# Patient Record
Sex: FEMALE | Race: BLACK OR AFRICAN AMERICAN | NOT HISPANIC OR LATINO
[De-identification: names, ages, dates, MRNs, and addresses within clinical notes are randomized per-mention and may not be internally consistent; named-entity substitution may affect disease eponyms.]

---

## 2017-06-15 PROBLEM — Z00.00 ENCOUNTER FOR PREVENTIVE HEALTH EXAMINATION: Status: ACTIVE | Noted: 2017-06-15

## 2017-06-23 ENCOUNTER — APPOINTMENT (OUTPATIENT)
Dept: OBGYN | Facility: CLINIC | Age: 32
End: 2017-06-23

## 2017-06-23 VITALS
WEIGHT: 181 LBS | HEART RATE: 79 BPM | SYSTOLIC BLOOD PRESSURE: 132 MMHG | DIASTOLIC BLOOD PRESSURE: 74 MMHG | BODY MASS INDEX: 28.41 KG/M2 | HEIGHT: 67 IN

## 2017-07-05 LAB
C TRACH RRNA SPEC QL NAA+PROBE: NORMAL
CYTOLOGY CVX/VAG DOC THIN PREP: NORMAL
N GONORRHOEA RRNA SPEC QL NAA+PROBE: NORMAL
SOURCE AMPLIFICATION: NORMAL

## 2017-08-07 LAB
CORE LAB BIOPSY: NORMAL
HPV HIGH+LOW RISK DNA PNL CVX: NEGATIVE

## 2018-07-06 ENCOUNTER — APPOINTMENT (OUTPATIENT)
Dept: OBGYN | Facility: CLINIC | Age: 33
End: 2018-07-06

## 2018-07-25 ENCOUNTER — APPOINTMENT (OUTPATIENT)
Dept: OBGYN | Facility: CLINIC | Age: 33
End: 2018-07-25
Payer: COMMERCIAL

## 2018-07-25 VITALS
SYSTOLIC BLOOD PRESSURE: 120 MMHG | DIASTOLIC BLOOD PRESSURE: 81 MMHG | HEIGHT: 67 IN | HEART RATE: 76 BPM | WEIGHT: 178 LBS | BODY MASS INDEX: 27.94 KG/M2

## 2018-07-25 PROCEDURE — 99213 OFFICE O/P EST LOW 20 MIN: CPT

## 2018-07-25 RX ORDER — LANCETS 33 GAUGE
EACH MISCELLANEOUS
Qty: 900 | Refills: 0 | Status: ACTIVE | COMMUNITY
Start: 2018-05-14

## 2018-07-26 ENCOUNTER — APPOINTMENT (OUTPATIENT)
Dept: OBGYN | Facility: CLINIC | Age: 33
End: 2018-07-26
Payer: COMMERCIAL

## 2018-07-26 ENCOUNTER — ASOB RESULT (OUTPATIENT)
Age: 33
End: 2018-07-26

## 2018-07-26 PROCEDURE — 76830 TRANSVAGINAL US NON-OB: CPT

## 2018-07-28 LAB — BACTERIA UR CULT: NORMAL

## 2018-07-30 ENCOUNTER — OTHER (OUTPATIENT)
Age: 33
End: 2018-07-30

## 2018-08-24 ENCOUNTER — APPOINTMENT (OUTPATIENT)
Dept: OBGYN | Facility: CLINIC | Age: 33
End: 2018-08-24

## 2018-09-26 ENCOUNTER — APPOINTMENT (OUTPATIENT)
Dept: OBGYN | Facility: CLINIC | Age: 33
End: 2018-09-26
Payer: COMMERCIAL

## 2018-09-26 VITALS
HEIGHT: 67 IN | DIASTOLIC BLOOD PRESSURE: 81 MMHG | WEIGHT: 180 LBS | BODY MASS INDEX: 28.25 KG/M2 | HEART RATE: 74 BPM | SYSTOLIC BLOOD PRESSURE: 124 MMHG

## 2018-09-26 DIAGNOSIS — R10.2 PELVIC AND PERINEAL PAIN: ICD-10-CM

## 2018-09-26 DIAGNOSIS — N92.6 IRREGULAR MENSTRUATION, UNSPECIFIED: ICD-10-CM

## 2018-09-26 PROCEDURE — 99213 OFFICE O/P EST LOW 20 MIN: CPT | Mod: 25

## 2018-09-26 PROCEDURE — 99395 PREV VISIT EST AGE 18-39: CPT

## 2018-10-07 LAB — BACTERIA UR CULT: NORMAL

## 2019-08-06 ENCOUNTER — APPOINTMENT (OUTPATIENT)
Dept: OBGYN | Facility: CLINIC | Age: 34
End: 2019-08-06
Payer: COMMERCIAL

## 2019-08-06 VITALS
DIASTOLIC BLOOD PRESSURE: 83 MMHG | BODY MASS INDEX: 29.35 KG/M2 | HEART RATE: 89 BPM | WEIGHT: 187 LBS | HEIGHT: 67 IN | SYSTOLIC BLOOD PRESSURE: 127 MMHG

## 2019-08-06 DIAGNOSIS — N76.0 ACUTE VAGINITIS: ICD-10-CM

## 2019-08-06 PROCEDURE — 99214 OFFICE O/P EST MOD 30 MIN: CPT

## 2019-08-06 RX ORDER — UREA 20 %
20 CREAM (GRAM) TOPICAL
Qty: 85 | Refills: 0 | Status: DISCONTINUED | COMMUNITY
Start: 2019-03-05

## 2019-08-06 RX ORDER — METFORMIN HYDROCHLORIDE 1000 MG/1
1000 TABLET, COATED ORAL
Qty: 90 | Refills: 0 | Status: DISCONTINUED | COMMUNITY
Start: 2018-02-06 | End: 2019-08-06

## 2019-08-06 NOTE — CHIEF COMPLAINT
[Urgent Visit] : Urgent Visit [FreeTextEntry1] : This 33 yo P2  presents c/o vaginal irritation without odor, some itching, first noted around the 23rd of July after intercourse; reports menses LMP 7/28 which finished yesterday, and now pt reports vague discomfort persists, no treatments tried;  reports past hx of infidelity and requests vaginal STD screen; no issues with voiding.  Also asking questions about starting contraception, presently not using contraception; has used injections in past;  Pt has been a non-insulin dependent diabetic over the last 5 years.

## 2019-08-06 NOTE — PHYSICAL EXAM
[Normal] : vagina [Labia Majora] : labia major [Labia Minora] : labia minora [Discharge] : a  ~M vaginal discharge was present [Scant] : scant [White] : white [No Bleeding] : there was no active vaginal bleeding [Foul Smelling] : not foul smelling

## 2019-08-06 NOTE — COUNSELING
[STD (testing, results, tx)] : STD (testing, results, tx) [Contraception] : contraception [Vulvar Hygiene] : vulvar hygiene

## 2019-08-08 ENCOUNTER — OTHER (OUTPATIENT)
Age: 34
End: 2019-08-08

## 2019-08-08 DIAGNOSIS — Z30.9 ENCOUNTER FOR CONTRACEPTIVE MANAGEMENT, UNSPECIFIED: ICD-10-CM

## 2019-08-08 LAB
C TRACH RRNA SPEC QL NAA+PROBE: NOT DETECTED
CANDIDA VAG CYTO: NOT DETECTED
G VAGINALIS+PREV SP MTYP VAG QL MICRO: NOT DETECTED
N GONORRHOEA RRNA SPEC QL NAA+PROBE: NOT DETECTED
SOURCE AMPLIFICATION: NORMAL
T VAGINALIS VAG QL WET PREP: NOT DETECTED

## 2019-08-08 RX ORDER — NORETHINDRONE 0.35 MG/1
0.35 TABLET ORAL DAILY
Qty: 28 | Refills: 3 | Status: ACTIVE | COMMUNITY
Start: 2019-08-08 | End: 1900-01-01

## 2019-12-27 ENCOUNTER — APPOINTMENT (OUTPATIENT)
Dept: OBGYN | Facility: CLINIC | Age: 34
End: 2019-12-27
Payer: COMMERCIAL

## 2019-12-27 VITALS — DIASTOLIC BLOOD PRESSURE: 81 MMHG | SYSTOLIC BLOOD PRESSURE: 127 MMHG | HEART RATE: 109 BPM | HEIGHT: 67 IN

## 2019-12-27 PROCEDURE — 99213 OFFICE O/P EST LOW 20 MIN: CPT

## 2019-12-28 LAB
C TRACH RRNA SPEC QL NAA+PROBE: NOT DETECTED
CANDIDA VAG CYTO: NOT DETECTED
G VAGINALIS+PREV SP MTYP VAG QL MICRO: DETECTED
N GONORRHOEA RRNA SPEC QL NAA+PROBE: NOT DETECTED
SOURCE AMPLIFICATION: NORMAL
T VAGINALIS VAG QL WET PREP: NOT DETECTED

## 2019-12-30 NOTE — REVIEW OF SYSTEMS
[Vaginal Discharge] : vaginal discharge [Vaginal Odor] : vaginal odor [Nl] : Integumentary [Vaginal Itching] : no vaginal itching [Vaginal Bleeding] : no vaginal bleeding [Vulvar Pain] : no vulvar pain [Vulvar Itching] : no vulvar itching [Vulvar Lump] : no vulvar lump or mass [Labial Swelling] : no labial swelling

## 2019-12-30 NOTE — CHIEF COMPLAINT
[Urgent Visit] : Urgent Visit [FreeTextEntry1] : 35 y/o with c/o of vaginal discharge x 1 week.  +odor; no tich; discharge is white.  No aggravating or alleviating factors.  Pt has not taken any medicine for it.

## 2019-12-30 NOTE — PHYSICAL EXAM
[Normal] : uterus [Moderate] : moderate [Foul Smelling] : foul smelling [White] : white [Thin] : thin [No Bleeding] : there was no active vaginal bleeding [Normal Position] : in a normal position [Mass ___ cm] : a [unfilled] ~Ucm uterine mass was palpated [Uterine Adnexae] : were not tender and not enlarged [Atrophy] : no atrophy [Erythema] : no erythema [Cystocele] : no cystocele [Rectocele] : no rectocele [Discharge] : had no discharge [Pap Obtained] : a Pap smear was not performed [IUD String] : did not have an IUD string protruding out [Motion Tenderness] : there was no cervical motion tenderness [Tenderness] : nontender [Enlarged ___ wks] : not enlarged [Adnexa Tenderness] : were not tender [Ovarian Mass (___ Cm)] : there were no adnexal masses [FreeTextEntry4] : wet mount - +clue cells +whiff

## 2020-12-21 PROBLEM — N76.0 ACUTE VAGINITIS: Status: RESOLVED | Noted: 2019-08-06 | Resolved: 2020-12-21

## 2021-03-29 ENCOUNTER — APPOINTMENT (OUTPATIENT)
Dept: OBGYN | Facility: CLINIC | Age: 36
End: 2021-03-29

## 2021-04-15 ENCOUNTER — APPOINTMENT (OUTPATIENT)
Dept: OBGYN | Facility: CLINIC | Age: 36
End: 2021-04-15
Payer: COMMERCIAL

## 2021-04-15 VITALS
BODY MASS INDEX: 29.19 KG/M2 | HEART RATE: 81 BPM | TEMPERATURE: 97 F | HEIGHT: 67 IN | DIASTOLIC BLOOD PRESSURE: 82 MMHG | SYSTOLIC BLOOD PRESSURE: 121 MMHG | WEIGHT: 186 LBS

## 2021-04-15 PROCEDURE — 99395 PREV VISIT EST AGE 18-39: CPT

## 2021-04-15 PROCEDURE — 99072 ADDL SUPL MATRL&STAF TM PHE: CPT

## 2021-04-15 PROCEDURE — 99212 OFFICE O/P EST SF 10 MIN: CPT | Mod: 25

## 2021-04-16 LAB
C TRACH RRNA SPEC QL NAA+PROBE: NOT DETECTED
HPV HIGH+LOW RISK DNA PNL CVX: NOT DETECTED
N GONORRHOEA RRNA SPEC QL NAA+PROBE: NOT DETECTED
SOURCE TP AMPLIFICATION: NORMAL

## 2021-04-19 LAB — CYTOLOGY CVX/VAG DOC THIN PREP: NORMAL

## 2021-05-07 ENCOUNTER — APPOINTMENT (OUTPATIENT)
Dept: OBGYN | Facility: CLINIC | Age: 36
End: 2021-05-07
Payer: COMMERCIAL

## 2021-05-07 ENCOUNTER — ASOB RESULT (OUTPATIENT)
Age: 36
End: 2021-05-07

## 2021-05-07 PROCEDURE — 76830 TRANSVAGINAL US NON-OB: CPT

## 2021-05-07 PROCEDURE — 99072 ADDL SUPL MATRL&STAF TM PHE: CPT

## 2021-05-10 NOTE — HISTORY OF PRESENT ILLNESS
[FreeTextEntry1] : 35 y/o LMP 3/29/21 here for annual exam.  Pt with c/o of heavy periods.  Periods are regular but heavy.  Pt has also had recent surgery on her finger to remove a tumor but it was benign.  \par \par Last PAP 2019 - WNL\par \par Chart reviewed\par No other changes to PMH as per pt

## 2021-05-10 NOTE — REVIEW OF SYSTEMS
[Abn Vaginal bleeding] : abnormal vaginal bleeding [Negative] : Heme/Lymph [FreeTextEntry8] : menorrhagia

## 2021-05-10 NOTE — DISCUSSION/SUMMARY
[FreeTextEntry1] : A/P Pt with nl Gyn exam but pt with hx of menorrhagia.  Pt in stable condition.\par \par Will\par 1) PAP, gc, chl, HPV done\par 2) Pelvic sono to check for l uteri/adenomyosis\par 3) RTC one year or prn\par \par MARIANNA Lewis

## 2021-07-02 ENCOUNTER — APPOINTMENT (OUTPATIENT)
Dept: OBGYN | Facility: CLINIC | Age: 36
End: 2021-07-02

## 2021-09-14 ENCOUNTER — APPOINTMENT (OUTPATIENT)
Dept: OBGYN | Facility: CLINIC | Age: 36
End: 2021-09-14
Payer: COMMERCIAL

## 2021-09-14 VITALS
TEMPERATURE: 97.4 F | SYSTOLIC BLOOD PRESSURE: 123 MMHG | WEIGHT: 185 LBS | HEIGHT: 67 IN | BODY MASS INDEX: 29.03 KG/M2 | HEART RATE: 79 BPM | DIASTOLIC BLOOD PRESSURE: 80 MMHG

## 2021-09-14 DIAGNOSIS — N90.89 OTHER SPECIFIED NONINFLAMMATORY DISORDERS OF VULVA AND PERINEUM: ICD-10-CM

## 2021-09-14 DIAGNOSIS — N92.0 EXCESSIVE AND FREQUENT MENSTRUATION WITH REGULAR CYCLE: ICD-10-CM

## 2021-09-14 PROCEDURE — 99213 OFFICE O/P EST LOW 20 MIN: CPT

## 2021-09-14 NOTE — PHYSICAL EXAM
[Labia Majora] : normal [Labia Minora] : normal [Normal] :  normal [FreeTextEntry1] : two nevi , one in right inguinal area, and one on right thigh.

## 2021-09-14 NOTE — HISTORY OF PRESENT ILLNESS
[FreeTextEntry1] : pt presents for follow up, LNMP, 9/2/21, pt reports having menorrhagia, and had sono  5/7/21 to evaluate menorrhagia \par pt also c/o 'warty' lesion on right thigh and groin area.

## 2021-09-14 NOTE — DISCUSSION/SUMMARY
[FreeTextEntry1] : A - benign nevi of vulva\par      menorrhagia secondary to adenomyosis\par \par P- f/u 1 week for removal  of nevi. \par     pt counselled on findings of May 7th, sono. revealing adenomyosis, .pt elects expectant management.

## 2021-09-22 ENCOUNTER — APPOINTMENT (OUTPATIENT)
Dept: OBGYN | Facility: CLINIC | Age: 36
End: 2021-09-22
Payer: COMMERCIAL

## 2021-09-22 VITALS
WEIGHT: 183 LBS | DIASTOLIC BLOOD PRESSURE: 86 MMHG | BODY MASS INDEX: 28.72 KG/M2 | HEIGHT: 67 IN | SYSTOLIC BLOOD PRESSURE: 132 MMHG

## 2021-09-22 PROCEDURE — 56605 BIOPSY OF VULVA/PERINEUM: CPT

## 2021-09-22 NOTE — PROCEDURE
[Vulvar Biopsy] : Vulvar Biopsy [Time out performed] : Pre-procedure time out performed.  Patient's name, date of birth and procedure confirmed. [Consent Obtained] : Consent obtained [] : on the right thigh [Size of Biopsy Taken: ___ (mm)] : [unfilled]Umm [Local Anesthesia] : local anesthesia [Betadine] : Betadine [Sent to Pathology] : placed in buffered formalin and sent for pathology [Punch] : punch biopsy [Silver Nitrate] : silver nitrate [Tolerated Well] : the patient tolerated the procedure well [No Complications] : there were no complications

## 2021-09-28 ENCOUNTER — APPOINTMENT (OUTPATIENT)
Dept: OBGYN | Facility: CLINIC | Age: 36
End: 2021-09-28
Payer: COMMERCIAL

## 2021-09-28 VITALS
SYSTOLIC BLOOD PRESSURE: 130 MMHG | BODY MASS INDEX: 29.55 KG/M2 | DIASTOLIC BLOOD PRESSURE: 91 MMHG | TEMPERATURE: 96.4 F | HEART RATE: 77 BPM | WEIGHT: 188.25 LBS | HEIGHT: 67 IN

## 2021-09-28 DIAGNOSIS — D28.0 BENIGN NEOPLASM OF VULVA: ICD-10-CM

## 2021-09-28 PROCEDURE — 99212 OFFICE O/P EST SF 10 MIN: CPT

## 2021-09-28 NOTE — HISTORY OF PRESENT ILLNESS
[FreeTextEntry1] : pt presents for follow up, s/p excision  via punch biopsy of benign vulvar lesions with AgNO3 cauterization 6 days ago. pt is concerned that they may be infected.

## 2021-09-28 NOTE — DISCUSSION/SUMMARY
[FreeTextEntry1] : A - Benign Neoplasm of Vulva, s/p excision ( 2 areas)\par \par P- areas with appropriate induration and healing \par     pt reassured, and instructed to wash areas directly with soap and water. \par     anticipate full healing in 2-3 weeks.

## 2022-10-20 ENCOUNTER — APPOINTMENT (OUTPATIENT)
Dept: OBGYN | Facility: CLINIC | Age: 37
End: 2022-10-20

## 2022-10-20 VITALS
HEIGHT: 67 IN | DIASTOLIC BLOOD PRESSURE: 85 MMHG | HEART RATE: 89 BPM | SYSTOLIC BLOOD PRESSURE: 121 MMHG | WEIGHT: 182 LBS | BODY MASS INDEX: 28.56 KG/M2

## 2022-10-20 DIAGNOSIS — B37.31 ACUTE CANDIDIASIS OF VULVA AND VAGINA: ICD-10-CM

## 2022-10-20 DIAGNOSIS — N90.89 OTHER SPECIFIED NONINFLAMMATORY DISORDERS OF VULVA AND PERINEUM: ICD-10-CM

## 2022-10-20 PROCEDURE — 99213 OFFICE O/P EST LOW 20 MIN: CPT

## 2022-10-20 RX ORDER — FLUCONAZOLE 150 MG/1
150 TABLET ORAL
Qty: 1 | Refills: 1 | Status: DISCONTINUED | COMMUNITY
Start: 2019-12-27 | End: 2022-10-20

## 2022-10-20 RX ORDER — METRONIDAZOLE 500 MG/1
500 TABLET ORAL TWICE DAILY
Qty: 14 | Refills: 1 | Status: DISCONTINUED | COMMUNITY
Start: 2019-12-27 | End: 2022-10-20

## 2022-10-21 NOTE — PHYSICAL EXAM
[Chaperone Present] : A chaperone was present in the examining room during all aspects of the physical examination [Discharge] : a  ~M vaginal discharge was present [Moderate] : moderate [White] : white [Cheesy] : cheesy [Blood-Tinged] : blood-tinged [Scant] : there was scant vaginal bleeding [de-identified] : mild labia majora swelling, slight erythema, no visible lesions

## 2022-10-21 NOTE — CHIEF COMPLAINT
[Urgent Visit] : Urgent Visit [FreeTextEntry1] : This 38 yo presents c/o noting about 5 days ago, an intense irritation to her vulvar area after use of a peripad for her menstrual cycle, she stopped use of the pad but the irritation persists, as well as a vaginal itchy discharge;  she has not treated the symptoms, and upon questioning states that her blood sugars are not well controlled; she also states her  has been away, and she is concerned that her symptoms may be from him.

## 2022-10-24 LAB
HHV SPEC CULT: NORMAL
HSV TYPE 1: NORMAL
HSV TYPE 2: NORMAL

## 2022-10-25 LAB
A VAGINAE DNA VAG QL NAA+PROBE: NORMAL
BVAB2 DNA VAG QL NAA+PROBE: NORMAL
C KRUSEI DNA VAG QL NAA+PROBE: NEGATIVE
C KRUSEI DNA VAG QL NAA+PROBE: POSITIVE
C TRACH RRNA SPEC QL NAA+PROBE: NEGATIVE
MEGA1 DNA VAG QL NAA+PROBE: NORMAL
N GONORRHOEA RRNA SPEC QL NAA+PROBE: NEGATIVE
T VAGINALIS RRNA SPEC QL NAA+PROBE: NEGATIVE

## 2023-09-28 RX ORDER — NYSTATIN 100000 U/G
100000 OINTMENT TOPICAL 3 TIMES DAILY
Qty: 1 | Refills: 0 | Status: ACTIVE | COMMUNITY
Start: 2022-10-20 | End: 1900-01-01

## 2023-10-18 ENCOUNTER — APPOINTMENT (OUTPATIENT)
Dept: OBGYN | Facility: CLINIC | Age: 38
End: 2023-10-18
Payer: COMMERCIAL

## 2023-10-18 VITALS
WEIGHT: 184 LBS | SYSTOLIC BLOOD PRESSURE: 135 MMHG | HEIGHT: 67 IN | HEART RATE: 83 BPM | BODY MASS INDEX: 28.88 KG/M2 | DIASTOLIC BLOOD PRESSURE: 88 MMHG

## 2023-10-18 DIAGNOSIS — B96.89 ACUTE VAGINITIS: ICD-10-CM

## 2023-10-18 DIAGNOSIS — N76.0 ACUTE VAGINITIS: ICD-10-CM

## 2023-10-18 DIAGNOSIS — Z01.419 ENCOUNTER FOR GYNECOLOGICAL EXAMINATION (GENERAL) (ROUTINE) W/OUT ABNORMAL FINDINGS: ICD-10-CM

## 2023-10-18 PROCEDURE — 99395 PREV VISIT EST AGE 18-39: CPT

## 2023-10-18 PROCEDURE — 99213 OFFICE O/P EST LOW 20 MIN: CPT | Mod: 25

## 2023-10-18 RX ORDER — METRONIDAZOLE 7.5 MG/G
0.75 GEL VAGINAL
Qty: 1 | Refills: 3 | Status: ACTIVE | COMMUNITY
Start: 2023-10-18 | End: 1900-01-01

## 2023-10-19 LAB
C TRACH RRNA SPEC QL NAA+PROBE: NOT DETECTED
CANDIDA VAG CYTO: NOT DETECTED
G VAGINALIS+PREV SP MTYP VAG QL MICRO: DETECTED
HPV HIGH+LOW RISK DNA PNL CVX: NOT DETECTED
N GONORRHOEA RRNA SPEC QL NAA+PROBE: NOT DETECTED
SOURCE AMPLIFICATION: NORMAL
T VAGINALIS VAG QL WET PREP: NOT DETECTED

## 2023-10-23 LAB — CYTOLOGY CVX/VAG DOC THIN PREP: ABNORMAL

## 2023-10-27 RX ORDER — FLUCONAZOLE 150 MG/1
150 TABLET ORAL
Qty: 2 | Refills: 1 | Status: ACTIVE | COMMUNITY
Start: 2022-10-20 | End: 1900-01-01

## 2024-01-31 ENCOUNTER — RESULT CHARGE (OUTPATIENT)
Age: 39
End: 2024-01-31

## 2024-01-31 ENCOUNTER — APPOINTMENT (OUTPATIENT)
Dept: ENDOCRINOLOGY | Facility: CLINIC | Age: 39
End: 2024-01-31
Payer: COMMERCIAL

## 2024-01-31 VITALS
DIASTOLIC BLOOD PRESSURE: 92 MMHG | HEART RATE: 76 BPM | OXYGEN SATURATION: 100 % | HEIGHT: 67 IN | RESPIRATION RATE: 17 BRPM | WEIGHT: 187 LBS | BODY MASS INDEX: 29.35 KG/M2 | SYSTOLIC BLOOD PRESSURE: 139 MMHG | TEMPERATURE: 98.3 F

## 2024-01-31 LAB — HBA1C MFR BLD HPLC: 8.7

## 2024-01-31 PROCEDURE — 99204 OFFICE O/P NEW MOD 45 MIN: CPT

## 2024-01-31 PROCEDURE — G2211 COMPLEX E/M VISIT ADD ON: CPT

## 2024-01-31 RX ORDER — SITAGLIPTIN 100 MG/1
100 TABLET, FILM COATED ORAL
Qty: 90 | Refills: 0 | Status: COMPLETED | COMMUNITY
Start: 2017-11-10 | End: 2024-01-31

## 2024-01-31 NOTE — PHYSICAL EXAM
[Alert] : alert [Well Nourished] : well nourished [Healthy Appearance] : healthy appearance [No Acute Distress] : no acute distress [Well Developed] : well developed [EOMI] : extra ocular movement intact [Normal Hearing] : hearing was normal [Supple] : the neck was supple [Thyroid Not Enlarged] : the thyroid was not enlarged [No Respiratory Distress] : no respiratory distress [No Accessory Muscle Use] : no accessory muscle use [Normal Rate and Effort] : normal respiratory rate and effort [Clear to Auscultation] : lungs were clear to auscultation bilaterally [Normal S1, S2] : normal S1 and S2 [Normal Rate] : heart rate was normal [Regular Rhythm] : with a regular rhythm [No Edema] : no peripheral edema [Normal Bowel Sounds] : normal bowel sounds [Not Tender] : non-tender [Not Distended] : not distended [Soft] : abdomen soft [No HSM] : no hepato-splenomegaly [No Stigmata of Cushings Syndrome] : no stigmata of Cushings Syndrome [No Clubbing, Cyanosis] : no clubbing  or cyanosis of the fingernails [No Involuntary Movements] : no involuntary movements were seen [Normal Strength/Tone] : muscle strength and tone were normal [Right foot was examined, including] : right foot ~C was examined, including visual inspection with sensory and pulse exams [Left foot was examined, including] : left foot ~C was examined, including visual inspection with sensory and pulse exams [Normal] : normal [2+] : 2+ in the dorsalis pedis [Oriented x3] : oriented to person, place, and time [Normal Affect] : the affect was normal [Normal Mood] : the mood was normal [Diminished Throughout Both Feet] : normal tactile sensation with monofilament testing throughout both feet

## 2024-01-31 NOTE — REASON FOR VISIT
[Initial Evaluation] : an initial evaluation [DM Type 2] : DM Type 2 [Family Member] : family member [FreeTextEntry2] : Dr. Mumtaz Winters

## 2024-01-31 NOTE — CONSULT LETTER
[Dear  ___] : Dear  [unfilled], [Consult Letter:] : I had the pleasure of evaluating your patient, [unfilled]. [Please see my note below.] : Please see my note below. [Consult Closing:] : Thank you very much for allowing me to participate in the care of this patient.  If you have any questions, please do not hesitate to contact me. [Sincerely,] : Sincerely, [FreeTextEntry2] : Dr. Mumtaz Winters [address] [address] [FreeTextEntry3] : Jesse Ramirez DO Division of [facility]  [facility] [employer] Director of [facility]

## 2024-01-31 NOTE — ASSESSMENT
[FreeTextEntry1] : 39 y/o F w/ uncontrolled Type 2 DM- Discussed the importance of improved glycemic control to prevent complications of hyperglycemia. Goal A1c < 7% Currently above goal on current regimen. Now on max dose metformin and glimepiride. Discussed additional treatment options such as a weekly GLP-1 or GLP/GIP like Mounjaro with less risk of hypoglycemia, easier administration only once a week, weight loss benefits, and potential CV and renal benefits. She denies any hx of pancreatitis, medullary thyroid ca  or pancreatic ca. Glimepiride does not offer as much additional benefits as the above. Discussed podiatry follow-up. Optho follow-up for retinopathy screening. The importance of avoiding hypoglycemia and treatment and management of hypoglycemia. Dietary and lifestyle modifications discussed as well. Check micro/cr with next lab work. Monitor BP. Consider statin after age 40 given risk factors.

## 2024-01-31 NOTE — DATA REVIEWED
[FreeTextEntry1] : Labs 1/31/2024: A1c 8.7%  Labs 1/9/2024: A1c 7.7% CBC normal CMP normal TSH at goal Lipid Profile at goal

## 2024-01-31 NOTE — HISTORY OF PRESENT ILLNESS
[FreeTextEntry1] : 37 y/o F w/ Type 2 DM since 2010. No paresthesias. No hx of foot ulcers. No hx of retinopathy. Last optho 2023 No hx of nephropathy. Currently on metformin 1000mg BID. Added glimepiride now taking 2mg BID. Adherent to medications. Previously was on Januvia with good control so taken off with just metformin.  Checks glucose via glucometer a few times a day with values averaging around 200. Lowest 130; Highest 300.  No hypoglycemia or symptoms of hypoglycemia. Grandmother and Mother with Type 2 DM. No recent steroid use. Tries to limit carbs. No soda or juice. Drinks tea with milk. Exercises on the treadmill. No polydipsia. + polyuria. +nausea with hyperglycemia. Menses regular.  No personal history of pancreatitis or pancreatic cancer.   No hx of HTN  Not on statin

## 2024-01-31 NOTE — REVIEW OF SYSTEMS
[Recent Weight Gain (___ Lbs)] : recent weight gain: [unfilled] lbs [Diarrhea] : diarrhea [Polyuria] : polyuria [All other systems negative] : All other systems negative [Fatigue] : no fatigue [Visual Field Defect] : no visual field defect [Dysphagia] : no dysphagia [Dysphonia] : no dysphonia [Chest Pain] : no chest pain [Palpitations] : no palpitations [Shortness Of Breath] : no shortness of breath [Nausea] : no nausea [Constipation] : no constipation [Vomiting] : no vomiting [Irregular Menses] : regular menses [Headaches] : no headaches [Tremors] : no tremors [Polydipsia] : no polydipsia [Cold Intolerance] : no cold intolerance [Heat Intolerance] : no heat intolerance [FreeTextEntry7] : occasional diarrhea with metformin

## 2024-04-03 ENCOUNTER — APPOINTMENT (OUTPATIENT)
Dept: ENDOCRINOLOGY | Facility: CLINIC | Age: 39
End: 2024-04-03
Payer: COMMERCIAL

## 2024-04-03 VITALS — BODY MASS INDEX: 28.25 KG/M2 | WEIGHT: 180 LBS | HEIGHT: 67 IN | HEART RATE: 77 BPM | OXYGEN SATURATION: 100 %

## 2024-04-03 DIAGNOSIS — E11.65 TYPE 2 DIABETES MELLITUS WITH HYPERGLYCEMIA: ICD-10-CM

## 2024-04-03 LAB — HBA1C MFR BLD HPLC: 6.5

## 2024-04-03 PROCEDURE — 99214 OFFICE O/P EST MOD 30 MIN: CPT

## 2024-04-03 PROCEDURE — 83036 HEMOGLOBIN GLYCOSYLATED A1C: CPT | Mod: QW

## 2024-04-03 RX ORDER — TIRZEPATIDE 7.5 MG/.5ML
7.5 INJECTION, SOLUTION SUBCUTANEOUS
Qty: 3 | Refills: 1 | Status: ACTIVE | COMMUNITY
Start: 2024-01-31 | End: 1900-01-01

## 2024-04-03 RX ORDER — METFORMIN HYDROCHLORIDE 1000 MG/1
1000 TABLET, COATED ORAL TWICE DAILY
Qty: 180 | Refills: 3 | Status: ACTIVE | COMMUNITY
Start: 1900-01-01 | End: 1900-01-01

## 2024-04-03 RX ORDER — BLOOD SUGAR DIAGNOSTIC
STRIP MISCELLANEOUS
Qty: 3 | Refills: 3 | Status: ACTIVE | COMMUNITY
Start: 2018-06-04 | End: 1900-01-01

## 2024-04-03 NOTE — REVIEW OF SYSTEMS
[Recent Weight Loss (___ Lbs)] : recent weight loss: [unfilled] lbs [Polyuria] : polyuria [Diarrhea] : diarrhea [All other systems negative] : All other systems negative [Visual Field Defect] : no visual field defect [Recent Weight Gain (___ Lbs)] : no recent weight gain [Fatigue] : no fatigue [Dysphagia] : no dysphagia [Dysphonia] : no dysphonia [Palpitations] : no palpitations [Chest Pain] : no chest pain [Shortness Of Breath] : no shortness of breath [Nausea] : no nausea [Constipation] : no constipation [Irregular Menses] : regular menses [Vomiting] : no vomiting [Tremors] : no tremors [Headaches] : no headaches [Polydipsia] : no polydipsia [Cold Intolerance] : no cold intolerance [Heat Intolerance] : no heat intolerance [FreeTextEntry7] : occasional diarrhea with metformin

## 2024-04-03 NOTE — PHYSICAL EXAM
[Well Nourished] : well nourished [Alert] : alert [Healthy Appearance] : healthy appearance [No Acute Distress] : no acute distress [Well Developed] : well developed [EOMI] : extra ocular movement intact [Supple] : the neck was supple [Normal Hearing] : hearing was normal [Thyroid Not Enlarged] : the thyroid was not enlarged [No Respiratory Distress] : no respiratory distress [Normal Rate and Effort] : normal respiratory rate and effort [No Accessory Muscle Use] : no accessory muscle use [Clear to Auscultation] : lungs were clear to auscultation bilaterally [Normal S1, S2] : normal S1 and S2 [Normal Rate] : heart rate was normal [Regular Rhythm] : with a regular rhythm [No Edema] : no peripheral edema [Not Tender] : non-tender [Normal Bowel Sounds] : normal bowel sounds [Not Distended] : not distended [Soft] : abdomen soft [No Stigmata of Cushings Syndrome] : no stigmata of Cushings Syndrome [No HSM] : no hepato-splenomegaly [No Clubbing, Cyanosis] : no clubbing  or cyanosis of the fingernails [No Involuntary Movements] : no involuntary movements were seen [Normal Strength/Tone] : muscle strength and tone were normal [Right foot was examined, including] : right foot ~C was examined, including visual inspection with sensory and pulse exams [Left foot was examined, including] : left foot ~C was examined, including visual inspection with sensory and pulse exams [Normal] : normal [2+] : 2+ in the dorsalis pedis [Oriented x3] : oriented to person, place, and time [Normal Affect] : the affect was normal [Normal Mood] : the mood was normal [Diminished Throughout Both Feet] : normal tactile sensation with monofilament testing throughout both feet

## 2024-04-03 NOTE — ASSESSMENT
[FreeTextEntry1] : 39 y/o F w/ uncontrolled Type 2 DM now improved- Discussed the importance of improved glycemic control to prevent complications of hyperglycemia. Goal A1c < 7% Now at goal on current regimen with max dose metformin and Mounjaro 5mg weekly. Pt. looking for more weight loss. Can increase to 7.5mg weekly for additional weight loss. She denies any hx of pancreatitis, medullary thyroid ca or pancreatic ca. Glimepiride does not offer as much additional benefits as the above, therefore was discontinued once Mounjaro was started. Discussed podiatry follow-up. Optho follow-up for retinopathy screening. The importance of avoiding hypoglycemia and treatment and management of hypoglycemia. Dietary and lifestyle modifications discussed as well. Check micro/cr with next lab work. Monitor BP. Consider statin after age 40 given risk factors.      Prep time with review of labs and interval progress notes and consultations  Discussion with patient regarding diabetes management plan, risks and benefits, treatment options and goals of care answering all patients questions and addressing all concerns  Post-visit completion charting and review  Total Time 30 min  Specifically causes, evaluation, treatment options, risks, complications, and benefits of available therapies were discussed. Questions were answered.  The submitted E/M billing level for this visit reflects the total time spent on the day of the visit including face-to-face time spent with the patient, non-face-to-face review of medical records and relevant information, documentation, and asynchronous communication with the patient after a visit via phone, email, or patients EHR portal after the visit.  The medical records reviewed are either scanned into the chart or reviewed with the patient using a patients electronic medical records portal for patients with records not available to [facility] via electronic transmission platforms from other institutions and labs.  Time spend counseling and performing coordination of care was also included in determining the appropriate EM billing level.  I have reviewed and verified information regarding the chief complaint and history recorded by the ancillary staff and/or the patient. I have independently reviewed and interpreted tests performed by other physicians and facilities as necessary.   I have discussed with the patient differential diagnosis, reason for auxiliary tests if ordered, risks, benefits, alternatives, and complications of each form of therapy were discussed.

## 2024-04-03 NOTE — DATA REVIEWED
[FreeTextEntry1] : Labs 4/3/2024 A1c 6.5%  Labs 1/31/2024: A1c 8.7% CMP normal except glucose 130 Lipid Profile at goal TSH 2.04  Labs 1/9/2024: A1c 7.7% CBC normal CMP normal TSH at goal Lipid Profile at goal

## 2024-04-03 NOTE — HISTORY OF PRESENT ILLNESS
[FreeTextEntry1] : 39 y/o F w/ Type 2 DM since 2010. No paresthesias. No hx of foot ulcers. No hx of retinopathy. Last optho 2023 No hx of nephropathy. Currently on metformin 1000mg BID. Added Mounjaro 25.mg weekly increased to 5mg weekly. Had been on glimepiride 2mg BID, but discontinued when Mounjaro started. Adherent to medications. Previously was on Januvia with good control so taken off with just metformin.  Checks glucose via glucometer a few times a day with values averaging around . Has lost some weight on current dose of Mounjaro but looking for additional weight loss.  No hypoglycemia or symptoms of hypoglycemia. Grandmother and Mother with Type 2 DM. No recent steroid use. Tries to limit carbs. No soda or juice. Drinks tea with milk. Exercises on the treadmill. No polydipsia. + polyuria. +nausea with hyperglycemia. Menses regular.  No personal history of pancreatitis or pancreatic cancer.   No hx of HTN  Not on statin

## 2024-07-03 ENCOUNTER — APPOINTMENT (OUTPATIENT)
Dept: ENDOCRINOLOGY | Facility: CLINIC | Age: 39
End: 2024-07-03
Payer: COMMERCIAL

## 2024-07-03 VITALS
DIASTOLIC BLOOD PRESSURE: 72 MMHG | OXYGEN SATURATION: 99 % | BODY MASS INDEX: 26.47 KG/M2 | HEART RATE: 84 BPM | SYSTOLIC BLOOD PRESSURE: 110 MMHG | WEIGHT: 169 LBS

## 2024-07-03 DIAGNOSIS — E11.65 TYPE 2 DIABETES MELLITUS WITH HYPERGLYCEMIA: ICD-10-CM

## 2024-07-03 LAB — HBA1C MFR BLD HPLC: 6.1

## 2024-07-03 PROCEDURE — 99214 OFFICE O/P EST MOD 30 MIN: CPT

## 2024-07-03 PROCEDURE — 83036 HEMOGLOBIN GLYCOSYLATED A1C: CPT | Mod: QW

## 2024-11-06 ENCOUNTER — APPOINTMENT (OUTPATIENT)
Dept: ENDOCRINOLOGY | Facility: CLINIC | Age: 39
End: 2024-11-06

## 2025-02-19 RX ORDER — TIRZEPATIDE 10 MG/.5ML
10 INJECTION, SOLUTION SUBCUTANEOUS
Qty: 1 | Refills: 3 | Status: ACTIVE | COMMUNITY
Start: 2025-02-19 | End: 1900-01-01